# Patient Record
Sex: FEMALE | Race: BLACK OR AFRICAN AMERICAN | NOT HISPANIC OR LATINO | URBAN - METROPOLITAN AREA
[De-identification: names, ages, dates, MRNs, and addresses within clinical notes are randomized per-mention and may not be internally consistent; named-entity substitution may affect disease eponyms.]

---

## 2022-10-26 ENCOUNTER — EMERGENCY (EMERGENCY)
Facility: HOSPITAL | Age: 77
LOS: 1 days | Discharge: HOME | End: 2022-10-26
Attending: EMERGENCY MEDICINE | Admitting: EMERGENCY MEDICINE
Payer: MEDICARE

## 2022-10-26 VITALS
SYSTOLIC BLOOD PRESSURE: 136 MMHG | HEART RATE: 88 BPM | OXYGEN SATURATION: 98 % | TEMPERATURE: 98 F | RESPIRATION RATE: 18 BRPM | DIASTOLIC BLOOD PRESSURE: 88 MMHG

## 2022-10-26 DIAGNOSIS — Z88.1 ALLERGY STATUS TO OTHER ANTIBIOTIC AGENTS STATUS: ICD-10-CM

## 2022-10-26 DIAGNOSIS — I11.9 HYPERTENSIVE HEART DISEASE WITHOUT HEART FAILURE: ICD-10-CM
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
## 2022-10-26 DIAGNOSIS — I77.6 ARTERITIS, UNSPECIFIED: ICD-10-CM

## 2022-10-26 DIAGNOSIS — R07.89 OTHER CHEST PAIN: ICD-10-CM

## 2022-10-26 PROCEDURE — 93010 ELECTROCARDIOGRAM REPORT: CPT

## 2022-10-26 PROCEDURE — 99285 EMERGENCY DEPT VISIT HI MDM: CPT

## 2022-10-26 NOTE — ED ADULT TRIAGE NOTE - ARRIVAL INFO ADDITIONAL COMMENTS
Pt to ED c/o Left rib pain after being picked up with hug on oct 1st. Seen by PCP same day and "they did not see a fracture". To ed for worsening left rib pain since then.

## 2022-10-27 VITALS
SYSTOLIC BLOOD PRESSURE: 137 MMHG | HEART RATE: 75 BPM | OXYGEN SATURATION: 97 % | RESPIRATION RATE: 18 BRPM | TEMPERATURE: 98 F | DIASTOLIC BLOOD PRESSURE: 82 MMHG

## 2022-10-27 LAB
ALBUMIN SERPL ELPH-MCNC: 3.7 G/DL — SIGNIFICANT CHANGE UP (ref 3.3–5)
ALP SERPL-CCNC: 69 U/L — SIGNIFICANT CHANGE UP (ref 40–120)
ALT FLD-CCNC: 11 U/L — SIGNIFICANT CHANGE UP (ref 10–45)
ANION GAP SERPL CALC-SCNC: 8 MMOL/L — SIGNIFICANT CHANGE UP (ref 5–17)
ANISOCYTOSIS BLD QL: SLIGHT — SIGNIFICANT CHANGE UP
AST SERPL-CCNC: 10 U/L — SIGNIFICANT CHANGE UP (ref 10–40)
BASOPHILS # BLD AUTO: 0.13 K/UL — SIGNIFICANT CHANGE UP (ref 0–0.2)
BASOPHILS NFR BLD AUTO: 1.8 % — SIGNIFICANT CHANGE UP (ref 0–2)
BILIRUB SERPL-MCNC: 0.2 MG/DL — SIGNIFICANT CHANGE UP (ref 0.2–1.2)
BUN SERPL-MCNC: 33 MG/DL — HIGH (ref 7–23)
CALCIUM SERPL-MCNC: 9.9 MG/DL — SIGNIFICANT CHANGE UP (ref 8.4–10.5)
CHLORIDE SERPL-SCNC: 106 MMOL/L — SIGNIFICANT CHANGE UP (ref 96–108)
CK MB CFR SERPL CALC: 1.7 NG/ML — SIGNIFICANT CHANGE UP (ref 0–6.7)
CK SERPL-CCNC: 19 U/L — LOW (ref 25–170)
CO2 SERPL-SCNC: 24 MMOL/L — SIGNIFICANT CHANGE UP (ref 22–31)
CREAT SERPL-MCNC: 0.98 MG/DL — SIGNIFICANT CHANGE UP (ref 0.5–1.3)
EGFR: 60 ML/MIN/1.73M2 — SIGNIFICANT CHANGE UP
EOSINOPHIL # BLD AUTO: 0 K/UL — SIGNIFICANT CHANGE UP (ref 0–0.5)
EOSINOPHIL NFR BLD AUTO: 0 % — SIGNIFICANT CHANGE UP (ref 0–6)
GIANT PLATELETS BLD QL SMEAR: PRESENT — SIGNIFICANT CHANGE UP
GLUCOSE SERPL-MCNC: 109 MG/DL — HIGH (ref 70–99)
HCT VFR BLD CALC: 41.3 % — SIGNIFICANT CHANGE UP (ref 34.5–45)
HGB BLD-MCNC: 12.6 G/DL — SIGNIFICANT CHANGE UP (ref 11.5–15.5)
LYMPHOCYTES # BLD AUTO: 2.73 K/UL — SIGNIFICANT CHANGE UP (ref 1–3.3)
LYMPHOCYTES # BLD AUTO: 37.6 % — SIGNIFICANT CHANGE UP (ref 13–44)
MANUAL SMEAR VERIFICATION: SIGNIFICANT CHANGE UP
MCHC RBC-ENTMCNC: 25.2 PG — LOW (ref 27–34)
MCHC RBC-ENTMCNC: 30.5 GM/DL — LOW (ref 32–36)
MCV RBC AUTO: 82.6 FL — SIGNIFICANT CHANGE UP (ref 80–100)
MONOCYTES # BLD AUTO: 1.07 K/UL — HIGH (ref 0–0.9)
MONOCYTES NFR BLD AUTO: 14.7 % — HIGH (ref 2–14)
NEUTROPHILS # BLD AUTO: 3.06 K/UL — SIGNIFICANT CHANGE UP (ref 1.8–7.4)
NEUTROPHILS NFR BLD AUTO: 42.2 % — LOW (ref 43–77)
OVALOCYTES BLD QL SMEAR: SLIGHT — SIGNIFICANT CHANGE UP
PLAT MORPH BLD: NORMAL — SIGNIFICANT CHANGE UP
PLATELET # BLD AUTO: 225 K/UL — SIGNIFICANT CHANGE UP (ref 150–400)
POLYCHROMASIA BLD QL SMEAR: SLIGHT — SIGNIFICANT CHANGE UP
POTASSIUM SERPL-MCNC: 4.5 MMOL/L — SIGNIFICANT CHANGE UP (ref 3.5–5.3)
POTASSIUM SERPL-SCNC: 4.5 MMOL/L — SIGNIFICANT CHANGE UP (ref 3.5–5.3)
PROT SERPL-MCNC: 7.3 G/DL — SIGNIFICANT CHANGE UP (ref 6–8.3)
RBC # BLD: 5 M/UL — SIGNIFICANT CHANGE UP (ref 3.8–5.2)
RBC # FLD: 15.9 % — HIGH (ref 10.3–14.5)
RBC BLD AUTO: ABNORMAL
SCHISTOCYTES BLD QL AUTO: SLIGHT — SIGNIFICANT CHANGE UP
SMUDGE CELLS # BLD: PRESENT — SIGNIFICANT CHANGE UP
SODIUM SERPL-SCNC: 138 MMOL/L — SIGNIFICANT CHANGE UP (ref 135–145)
TROPONIN T SERPL-MCNC: 0.01 NG/ML — SIGNIFICANT CHANGE UP (ref 0–0.01)
VARIANT LYMPHS # BLD: 3.7 % — SIGNIFICANT CHANGE UP (ref 0–6)
WBC # BLD: 7.25 K/UL — SIGNIFICANT CHANGE UP (ref 3.8–10.5)
WBC # FLD AUTO: 7.25 K/UL — SIGNIFICANT CHANGE UP (ref 3.8–10.5)

## 2022-10-27 PROCEDURE — 82553 CREATINE MB FRACTION: CPT

## 2022-10-27 PROCEDURE — 36415 COLL VENOUS BLD VENIPUNCTURE: CPT

## 2022-10-27 PROCEDURE — 99285 EMERGENCY DEPT VISIT HI MDM: CPT | Mod: 25

## 2022-10-27 PROCEDURE — 80053 COMPREHEN METABOLIC PANEL: CPT

## 2022-10-27 PROCEDURE — 93005 ELECTROCARDIOGRAM TRACING: CPT

## 2022-10-27 PROCEDURE — 71250 CT THORAX DX C-: CPT | Mod: 26,MA

## 2022-10-27 PROCEDURE — 82550 ASSAY OF CK (CPK): CPT

## 2022-10-27 PROCEDURE — 84484 ASSAY OF TROPONIN QUANT: CPT

## 2022-10-27 PROCEDURE — 71250 CT THORAX DX C-: CPT | Mod: MA

## 2022-10-27 PROCEDURE — 85025 COMPLETE CBC W/AUTO DIFF WBC: CPT

## 2022-10-27 RX ORDER — TRAMADOL HYDROCHLORIDE 50 MG/1
1 TABLET ORAL
Qty: 12 | Refills: 0
Start: 2022-10-27 | End: 2022-10-29

## 2022-10-27 RX ORDER — TRAMADOL HYDROCHLORIDE 50 MG/1
50 TABLET ORAL ONCE
Refills: 0 | Status: DISCONTINUED | OUTPATIENT
Start: 2022-10-27 | End: 2022-10-27

## 2022-10-27 RX ADMIN — TRAMADOL HYDROCHLORIDE 50 MILLIGRAM(S): 50 TABLET ORAL at 03:48

## 2022-10-27 NOTE — ED PROVIDER NOTE - OBJECTIVE STATEMENT
77F hx htn, vasculitis, c/o left sided chest pain. pt states on Oct 1 her nephew picked her up and squeezed her torso. states she developed left chest pain. pt states was seen by PMD and had negative xrays. was given lidocaine and baclofen without relief.  states pain worsening. worse with deep inspiration, movement and laying down.  no fevers. no vomiting. no abd pain.

## 2022-10-27 NOTE — ED PROVIDER NOTE - PROGRESS NOTE DETAILS
no rib fracture on CT. labs checked, doubt ACS.  recommend f/u with PMD  I have discussed the discharge plan with the patient. The patient agrees with the plan, as discussed.  The patient understands Emergency Department diagnosis is a preliminary diagnosis often based on limited information and that the patient must adhere to the follow-up plan as discussed.  The patient understands that if the symptoms worsen or if prescribed medications do not have the desired/planned effect that the patient may return to the Emergency Department at any time for further evaluation and treatment.

## 2022-10-27 NOTE — ED PROVIDER NOTE - NSFOLLOWUPINSTRUCTIONS_ED_ALL_ED_FT
Follow-up with your primary care physician      Rib Contusion    A rib contusion is a deep bruise on the rib area. Contusions are the result of a blunt trauma that causes bleeding and injury to the tissues under the skin. A rib contusion may involve bruising of the ribs and of the skin and muscles in the area. The skin over the contusion may turn blue, purple, or yellow. Minor injuries result in a painless contusion. More severe contusions may be painful and swollen for a few weeks.    What are the causes?    This condition is usually caused by a hard, direct hit to an area of the body. This often occurs while playing contact sports.    What are the signs or symptoms?    Symptoms of this condition include:  •Swelling and redness of the injured area.    •Discoloration of the injured area.    •Tenderness and soreness of the injured area.    •Pain with or without movement.    •Pain when breathing in.    How is this diagnosed?    This condition may be diagnosed based on:  •Your symptoms and medical history.    •A physical exam.    •Imaging tests—such as an X-ray, CT scan, or MRI—to determine if there were internal injuries or broken bones (fractures).    How is this treated?    This condition may be treated with:  •Rest. This is often the best treatment for a rib contusion.    •Ice packs. This reduces swelling and inflammation.    •Deep-breathing exercises. These may be recommended to reduce the risk for lung collapse and pneumonia.    •Medicines. Over-the-counter or prescription medicines may be given to control pain.    •Injection of a numbing medicine around the nerve near your injury (nerve block).    Follow these instructions at home:    Medicines     •Take over-the-counter and prescription medicines only as told by your health care provider.    •Ask your health care provider if the medicine prescribed to you:  •Requires you to avoid driving or using machinery.    •Can cause constipation. You may need to take these actions to prevent or treat constipation:  •Drink enough fluid to keep your urine pale yellow.     •Take over-the-counter or prescription medicines.    •Eat foods that are high in fiber, such as beans, whole grains, and fresh fruits and vegetables.    •Limit foods that are high in fat and processed sugars, such as fried or sweet foods.     Managing pain, stiffness, and swelling      If directed, put ice on the injured area. To do this:  •Put ice in a plastic bag.    •Place a towel between your skin and the bag.    •Leave the ice on for 20 minutes, 2–3 times a day.    •Remove the ice if your skin turns bright red. This is very important. If you cannot feel pain, heat, or cold, you have a greater risk of damage to the area.    Activity     •Rest the injured area.    •Avoid strenuous activity and any activities or movements that cause pain. Be careful during activities, and avoid bumping the injured area.    • Do not lift anything that is heavier than 5 lb (2.3 kg), or the limit that you are told, until your health care provider says that it is safe.    General instructions      • Do not use any products that contain nicotine or tobacco, such as cigarettes, e-cigarettes, and chewing tobacco. These can delay healing. If you need help quitting, ask your health care provider.    •Do deep-breathing exercises as told by your health care provider.    •If you were given an incentive spirometer, use it every 1–2 hours while you are awake, or as recommended by your health care provider. This device measures how well you are filling your lungs with each breath.    •Keep all follow-up visits. This is important.    Contact a health care provider if you have:    •Increased bruising or swelling.    •Pain that is not controlled with treatment.    •A fever.    Get help right away if you:    •Have difficulty breathing or shortness of breath.    •Develop a continual cough, or you cough up thick or bloody mucus from your lungs (sputum).    •Feel nauseous or you vomit.    •Have pain in your abdomen.    These symptoms may represent a serious problem that is an emergency. Do not wait to see if the symptoms will go away. Get medical help right away. Call your local emergency services (911 in the [place] Do not drive yourself to the hospital.     Summary    •A rib contusion is a deep bruise on your rib area. Contusions are the result of a blunt trauma that causes bleeding and injury to the tissues under the skin.    •The skin over the contusion may turn blue, purple, or yellow. Minor injuries may cause a painless contusion. More severe contusions may be painful and swollen for a few weeks.    •Rest the injured area. Avoid strenuous activity and any activities or movements that cause pain.    This information is not intended to replace advice given to you by your health care provider. Make sure you discuss any questions you have with your health care provider.

## 2022-10-27 NOTE — ED PROVIDER NOTE - PATIENT PORTAL LINK FT
You can access the FollowMyHealth Patient Portal offered by [facility] by registering at the following website: [email or web address]. By joining [facility]’s FollowMyHealth portal, you will also be able to view your health information using other applications (apps) compatible with our system.

## 2022-11-10 ENCOUNTER — EMERGENCY (EMERGENCY)
Facility: HOSPITAL | Age: 77
LOS: 1 days | Discharge: HOME | End: 2022-11-10
Attending: STUDENT IN AN ORGANIZED HEALTH CARE EDUCATION/TRAINING PROGRAM | Admitting: STUDENT IN AN ORGANIZED HEALTH CARE EDUCATION/TRAINING PROGRAM
Payer: MEDICARE

## 2022-11-10 VITALS
DIASTOLIC BLOOD PRESSURE: 107 MMHG | HEART RATE: 81 BPM | RESPIRATION RATE: 17 BRPM | OXYGEN SATURATION: 98 % | SYSTOLIC BLOOD PRESSURE: 155 MMHG | TEMPERATURE: 98 F

## 2022-11-10 VITALS
HEIGHT: 64 IN | TEMPERATURE: 98 F | DIASTOLIC BLOOD PRESSURE: 88 MMHG | RESPIRATION RATE: 18 BRPM | OXYGEN SATURATION: 94 % | WEIGHT: 179.9 LBS | SYSTOLIC BLOOD PRESSURE: 138 MMHG | HEART RATE: 92 BPM

## 2022-11-10 PROBLEM — I10 ESSENTIAL (PRIMARY) HYPERTENSION: Chronic | Status: ACTIVE | Noted: 2022-10-27

## 2022-11-10 PROCEDURE — 99284 EMERGENCY DEPT VISIT MOD MDM: CPT

## 2022-11-10 PROCEDURE — 99283 EMERGENCY DEPT VISIT LOW MDM: CPT

## 2022-11-10 NOTE — ED PROVIDER NOTE - NSFOLLOWUPINSTRUCTIONS_ED_ALL_ED_FT
Follow up with your primary medical doctor as soon as possible.  Follow up with otolaryngology (ear, nose & throat) - to schedule an appointment call [phone]  Return to the emergency department if your symptoms worsen or if you develop new symptoms.    Epistaxis    Epistaxis is the medical term for a nosebleed. Nosebleeds are common and can be caused by many conditions, such as injury, infections, dry environments, medicines, nose picking, and home heating and cooling systems. Try controlling your nosebleed by pinching your nose continuously for at least 10 minutes. Avoid lying down while you are having a nosebleed. Sit up and lean forward. Avoid blowing or sniffing your nose for a number of hours after having a nosebleed. Resume your normal activities as you are able, but avoid straining, lifting, or bending at the waist for several days. Maintain humidity in your home by using less air conditioning or by using a humidifier.     If your nose was packed by your health care provider, keep the packing inside of your nose until a health care provider removes it. If a balloon catheter was used to pack your nose, do not cut or remove it unless your health care provider has instructed you to do that.     Aspirin and blood thinners make bleeding more likely. If you are prescribed these medicines and you suffer from nosebleeds, ask your health care provider if you should stop taking the medicines or adjust the dose. Do not stop medicines unless directed by your health care provider.    SEEK IMMEDIATE MEDICAL CARE IF YOU HAVE ANY OF THE FOLLOWING SYMPTOMS: nosebleed lasting longer than 20 minutes, unusual bleeding from or bruising on other parts of your body, dizziness or lightheadedness, fainting, nosebleed occurring after a head injury, or fever.

## 2022-11-10 NOTE — CONSULT NOTE ADULT - SUBJECTIVE AND OBJECTIVE BOX
ENT Consult Note    CC: Epistaxis     HPI  78 y/o F with PMhx HTN (controlled), with chronic nosebleeds, once per month, presents to ED for persistent nosebleed. Patient reports that earlier this afternoon she blew her nose and noted bleeding. She held pressure for two hours but still noted bleeding anteriorly and from the back of her throat. She notes she has been cauterized in the past last winter using chemical cauterization and prefers this over packing. Uses home humidification and minimizes use of heater at home as this in the past exacerbates her nose bleeds.     On arrival to ED patient was bleeding from left nostril. A merocel was placed into left nostril but bleeding then noted from right side. Vitals wnl.      PE  NAD  Anterior rhinoscopy: small clot suctioned from left nostril, area of slow bleeding noted on anterior surface of middle turbinate. On right side, small area of clot/crust on anterior septum. OC: small clot    Bedside procedure: R/b/a of procedure discussed and patient verbally consented. Using nasal speculum, a silver nitrate stick was applied to two areas of active ooze on the left middle turbinate. SurgiFlo then placed into left nasal cavity. On right side, SurgiFlo was palced over a small area of dried crusting/clot on septum. A small piece of Surgicel was placed over the area of clot and held with pressure for 1 minute. Epistaxis was then noted to be controlled.    A/P: 77F PMH HTN p/w bilateral epistaxis s/p chemical cauterization and light nasal packing   -Packing is absorbable does not need to be removed  -Patient provided Afrin and coached on nasal hygiene  -Patient can call ENT office at [phone] for follow up if needed  -Please provide the patient with the following instructions upon discharge:    1. No nose blowing for three days. Sneeze with your mouth open.   2. Do not pick your nose.  3. Visit your primary care doctor to make sure your blood pressure is under control.  4. The packing in your nose is absorbable and will go away on its own. Do not try to take it out.   5. In three days, start using nasal saline sprays 2-3 times a day.  6. Use a humidifier  7. Put mupirocin or vaseline on your nostrils nightly.   8. If you have a nosebleed: Spray Afrin in both nostrils and hold pressure over the soft, lower part of your nose, firmly for 15 minutes without letting go (look at a clock to make sure you are holding it long enough). If you are still bleeding after that, try another round of 15 minutes. If you continue to bleed, head to the emergency room.

## 2022-11-10 NOTE — ED PROVIDER NOTE - PATIENT PORTAL LINK FT
You can access the FollowMyHealth Patient Portal offered by [facility] by registering at the following website: [email or web address]. By joining [facility]’s FollowMyHealth portal, you will also be able to view your health information using other applications (apps) compatible with our system.

## 2022-11-10 NOTE — ED PROVIDER NOTE - OBJECTIVE STATEMENT
76 yo F w PMH HTN, epistaxis s/p caudery p/w L nares epistaxis since today. Pt states episode started after blowing nose at 2pm today. Pt held pressure on nose for several hours, in ED bleeding has subsided. She is tolerating secretions, no MARVIN. She has no other complaints. 78 yo F w PMH HTN, epistaxis s/p cautery p/w L nares epistaxis since today. Pt states episode started after blowing nose at 2pm today. Pt held pressure on nose for several hours, in ED bleeding has subsided. She is tolerating secretions, no MARVIN. She has no other complaints.

## 2022-11-10 NOTE — ED PROVIDER NOTE - PHYSICAL EXAMINATION
CONSTITUTIONAL: Non-toxic; in no apparent distress  HEAD: Normocephalic; atraumatic  EYES: PERRL; EOM intact   ENMT: External appears normal, +L nares epistaxis, no septal hematoma  NECK: Supple; non-tender  CARD: Normal S1, S2; no murmurs, rubs, or gallops  RESP: Normal chest excursion with respiration; breath sounds clear and equal bilaterally  ABD: Soft, non-distended; non-tender  EXT: Normal ROM in all four extremities; non-tender to palpation  SKIN: Warm, dry, no rash  NEURO:  No focal neurological deficiencies.

## 2022-11-10 NOTE — ED PROVIDER NOTE - NS ED ROS FT
CONSTITUTIONAL: No fever, no chills, no fatigue  EYES: No eye redness, no visual changes  ENT: No ear pain, no sore throat, +nosebleed  CARDIOVASCULAR: No chest pain, no palpitations  RESPIRATORY: No cough, no SOB  GI: No abdominal pain, no nausea, no vomiting, no constipation, no diarrhea  GENITOURINARY: No dysuria, no frequency, no hematuria  MUSCULOSKELETAL: No back pain, no joint pain, no myalgias  SKIN: No rash, no peripheral edema  NEURO: No headache, no confusion    ALL OTHER SYSTEMS NEGATIVE.

## 2022-11-10 NOTE — ED PROVIDER NOTE - CLINICAL SUMMARY MEDICAL DECISION MAKING FREE TEXT BOX
Epistaxis, resolved in ED. Will see ENT tomorrow. No septal hematoma. Epistaxis, resolved in ED. Will see ENT tomorrow. No septal hematoma. No AC.

## 2022-11-10 NOTE — ED ADULT NURSE NOTE - OBJECTIVE STATEMENT
Patient is a 77yoF with hx of htn presenting to the ED for epistaxis x today. Patient is awake and alert, axox3, spont breathing on RA. Patient reports that she started to have nose bleeding that started on its own, denies trauma. Has been attempting to hold pressure on it with no relief. Reports that she is swallowing a lot of blood and can feel the blood dripping down her throat, airway intact. Denies ha/dizziness. States that she has had this happen in the past, denies taking AC. Denies chest pain, no nausea/vomiting.

## 2022-11-10 NOTE — ED ADULT TRIAGE NOTE - ARRIVAL INFO ADDITIONAL COMMENTS
Pt to ED c/o epistaxis to left nostril starting at 14:00. Denies trauma, blood thinner use, nor dizziness/ visual changes. Pt reports having cauterization due to epistaxis in [place]

## 2022-11-13 DIAGNOSIS — R04.0 EPISTAXIS: ICD-10-CM

## 2022-11-13 DIAGNOSIS — I10 ESSENTIAL (PRIMARY) HYPERTENSION: ICD-10-CM

## 2023-02-07 ENCOUNTER — APPOINTMENT (OUTPATIENT)
Dept: HOME HEALTH SERVICES | Facility: HOME HEALTH | Age: 78
End: 2023-02-07
Payer: MEDICARE

## 2023-02-07 VITALS
WEIGHT: 176.38 LBS | HEART RATE: 62 BPM | BODY MASS INDEX: 30.11 KG/M2 | HEIGHT: 64 IN | TEMPERATURE: 97.2 F | SYSTOLIC BLOOD PRESSURE: 140 MMHG | DIASTOLIC BLOOD PRESSURE: 95 MMHG | OXYGEN SATURATION: 99 % | RESPIRATION RATE: 16 BRPM

## 2023-02-07 DIAGNOSIS — E78.5 HYPERLIPIDEMIA, UNSPECIFIED: ICD-10-CM

## 2023-02-07 DIAGNOSIS — Z87.898 PERSONAL HISTORY OF OTHER SPECIFIED CONDITIONS: ICD-10-CM

## 2023-02-07 DIAGNOSIS — I10 ESSENTIAL (PRIMARY) HYPERTENSION: ICD-10-CM

## 2023-02-07 DIAGNOSIS — Z87.891 PERSONAL HISTORY OF NICOTINE DEPENDENCE: ICD-10-CM

## 2023-02-07 PROBLEM — Z00.00 ENCOUNTER FOR PREVENTIVE HEALTH EXAMINATION: Status: ACTIVE | Noted: 2023-02-07

## 2023-02-07 PROCEDURE — 99344 HOME/RES VST NEW MOD MDM 60: CPT | Mod: 25,95

## 2023-02-07 PROCEDURE — 99497 ADVNCD CARE PLAN 30 MIN: CPT | Mod: 95

## 2023-02-07 NOTE — HISTORY OF PRESENT ILLNESS
[FreeTextEntry2] : ALYSE WILLSON is being seen for a visit provided via telehealth. Real-time audio visual technology was provided using [employer]. If Teledoc technology was not used it was due to inability to connect via Teledoc technology.\par \par  \par \par ALYSE WILLSON (Sep 29 1945) or his/her representative was educated about potential risks associated with any technology used while obtaining care through telehealth during this public health emergency. ALYSE WILLSON (Sep 29 1945) or his/her representative was educated that this Informed Consent for Telehealth Services During a Public Health Emergency will remain in effect solely during the term of the public health emergency. ALYSE WILLSON (Sep 29 1945) or his/her representative has verbally consented to the use of telehealth on 02/07/2023  2:00PM.\par \par  \par \par ALYSE WILLSON was located at their home, [address] [place] [address]\[place] [address], at the time of the visit.\par \par The [facility] clinician, MIC SUNSHINE, was located remotely at their home in [place] at the time of the visit.\par \par The patient, ALYSE WILLSON, and the [facility] clinician, MIC SUNSHINE, participated in the telehealth encounter.\par \par Other participants included the RNTT, who was in the home with the patient, performed vitals monitoring and physical exam, and facilitated the video visit with MIC SUNSHINE. The assessment and plan was made on the basis of the information provided by the RNTT.\par \par  \par \par Other participants included: []\par \par  \par \par RNTT Findings:\par \par  \par \par      Vitals: T 97.5F HR 62 RR 16 /95mmHg SpO2 99%\par \par  \par \par      Exam: +1 bilateral pitting edema\par \par  \par \par      Assessment: ALYSE WILLSON is an 77 year with Hypertension, Hypertensive Heart Disease with CHF, CHF systolic dysfunction, Atrial Fibrillation, CVA, CHronic Hepatitis Cm Hyperlipidemia, Vasculitis seen today for initial home visit\par \par Patient's last hospitalization was in 11/2022 for CVA, admitted for CVA secondary to AFib, initially had right sided weakness which has improved. Patient reports she was placed on blood thinners for A Fib but would get severe nosebleeds that did not to conservative measures and usually needs cauterization. Patient reports she is awaiting Watchman placement scheduled for 03/09/2023. Patient uses 80mg verapamil if heart rate goes above 85.\par \par Since hospitalization patient reports no new complaints.\par \par \par Patient/ patient's caregiver reports no weight loss >10 lbs in the past 6 months. No changes in dentition or swallowing reported, No changes in hearing or vision reported. No changes in Cognition reported. Patient denies any symptoms of depression or anxiety. Patient is ADL independent and IADL independent. No changes in gait or falls reported. \par \par Patient's home environment is safe.\par  \par Goals of care discussed. MOLST not completed. Full code, No limitis\par \par

## 2023-02-07 NOTE — COUNSELING
[Obese -  ( BMI  >29.9 )] : obese - ( BMI  >29.9 ) [Weight counseling provided] : weight counseling provided [Medical/Nutritional supplementation as prescribed] : medical/nutritional supplementation as prescribed [Non - Smoker] : non-smoker [Medical alert] : medical alert [] : foot exam [Patient not on disease-modifying anti-rheumatic drug due to overall prognosis] : Patient not on disease-modifying anti-rheumatic drug due to overall prognosis [Completed] : Aspirin use discussion completed [Minimize unnecessary interventions] : minimize unnecessary interventions [Discussed disease trajectory with patient/caregiver] : discussed disease trajectory with patient/caregiver [Advanced Directives discussed: ____] : Advanced directives discussed: [unfilled] [Full Code] : Code Status: Full Code [No Limitations] : Treatment Guidelines: No limitations [Long Term Intubation] : Intubation: Long term intubation [[facility]: _____] [facility]: [unfilled] [_____] : HCP: [unfilled]

## 2023-02-07 NOTE — HEALTH RISK ASSESSMENT
[HRA Reviewed] : Health risk assessment reviewed [Independent] : managing finances [No falls in past year] : Patient reported no falls in the past year [No] : The patient does not have visual impairment [TimeGetUpGo] : 10

## 2023-02-07 NOTE — CHRONIC CARE ASSESSMENT
[PPS Score: ____] : Palliative Performance Scale (PPS) Score: [unfilled] [de-identified] : None [de-identified] : walks regularly [de-identified] : Low salt, low fat

## 2023-02-07 NOTE — PHYSICAL EXAM
[No Acute Distress] : no acute distress [Normal Sclera/Conjunctiva] : normal sclera/conjunctiva [EOMI] : extra ocular movement intact [Normal Outer Ear/Nose] : the ears and nose were normal in appearance [Normal Oropharynx] : the oropharynx was normal [No Respiratory Distress] : no respiratory distress [Clear to Auscultation] : lungs were clear to auscultation bilaterally [No Accessory Muscle Use] : no accessory muscle use [Normal Rate] : heart rate was normal  [Regular Rhythm] : with a regular rhythm [Normal S1, S2] : normal S1 and S2 [No Murmurs] : no murmurs heard [No Edema] : there was no peripheral edema [Normal Bowel Sounds] : normal bowel sounds [Non Tender] : non-tender [Soft] : abdomen soft [Not Distended] : not distended [Normal Post Cervical Nodes] : no posterior cervical lymphadenopathy [Normal Anterior Cervical Nodes] : no anterior cervical lymphadenopathy [No CVA Tenderness] : no ~M costovertebral angle tenderness [No Spinal Tenderness] : no spinal tenderness [Normal Gait] : normal gait [Normal Strength/Tone] : muscle strength and tone were normal [No Rash] : no rash [Oriented x3] : oriented to person, place, and time [Normal Affect] : the affect was normal [de-identified] : obese female sitting up on chair

## 2023-02-07 NOTE — REASON FOR VISIT
[Initial Evaluation] : an initial evaluation [Pre-Visit Preparation] : pre-visit preparation was not done [FreeTextEntry1] : Hypertension, Hypertensive Heart Disease with CHF, CHF systolic dysfunction, Atrial Fibrillation, CVA, CHronic Hepatitis Cm Hyperlipidemia, Vasculitis [FreeTextEntry3] : Cardiology, GI

## 2023-04-05 ENCOUNTER — TRANSCRIPTION ENCOUNTER (OUTPATIENT)
Age: 78
End: 2023-04-05

## 2023-04-22 ENCOUNTER — TRANSCRIPTION ENCOUNTER (OUTPATIENT)
Age: 78
End: 2023-04-22

## 2023-05-24 ENCOUNTER — TRANSCRIPTION ENCOUNTER (OUTPATIENT)
Age: 78
End: 2023-05-24

## 2023-05-24 ENCOUNTER — NON-APPOINTMENT (OUTPATIENT)
Age: 78
End: 2023-05-24

## 2023-05-25 ENCOUNTER — APPOINTMENT (OUTPATIENT)
Dept: HOME HEALTH SERVICES | Facility: HOME HEALTH | Age: 78
End: 2023-05-25

## 2023-05-25 VITALS
OXYGEN SATURATION: 98 % | RESPIRATION RATE: 17 BRPM | HEART RATE: 71 BPM | TEMPERATURE: 97 F | SYSTOLIC BLOOD PRESSURE: 120 MMHG | DIASTOLIC BLOOD PRESSURE: 80 MMHG

## 2023-05-25 RX ORDER — VERAPAMIL HYDROCHLORIDE 80 MG/1
80 TABLET ORAL AS DIRECTED
Qty: 90 | Refills: 3 | Status: ACTIVE | COMMUNITY
Start: 2023-02-07

## 2023-05-30 ENCOUNTER — APPOINTMENT (OUTPATIENT)
Dept: HOME HEALTH SERVICES | Facility: HOME HEALTH | Age: 78
End: 2023-05-30
Payer: MEDICARE

## 2023-05-30 VITALS
SYSTOLIC BLOOD PRESSURE: 128 MMHG | DIASTOLIC BLOOD PRESSURE: 74 MMHG | TEMPERATURE: 97.7 F | HEART RATE: 66 BPM | RESPIRATION RATE: 18 BRPM | OXYGEN SATURATION: 98 %

## 2023-05-30 PROCEDURE — 99349 HOME/RES VST EST MOD MDM 40: CPT

## 2023-05-30 RX ORDER — SPIRONOLACTONE AND HYDROCHLOROTHIAZIDE 25; 25 MG/1; MG/1
25-25 TABLET, FILM COATED ORAL DAILY
Qty: 90 | Refills: 3 | Status: ACTIVE | COMMUNITY
Start: 2023-02-07 | End: 1900-01-01

## 2023-05-30 RX ORDER — AMIODARONE HYDROCHLORIDE 200 MG/1
200 TABLET ORAL DAILY
Qty: 90 | Refills: 3 | Status: DISCONTINUED | COMMUNITY
Start: 2023-02-07 | End: 2023-05-30

## 2023-05-30 RX ORDER — DABIGATRAN ETEXILATE 150 MG/1
150 CAPSULE ORAL DAILY
Qty: 90 | Refills: 3 | Status: DISCONTINUED | COMMUNITY
Start: 2023-02-07 | End: 2023-05-30

## 2023-05-30 RX ORDER — VERAPAMIL HYDROCHLORIDE 240 MG/1
240 TABLET ORAL
Qty: 180 | Refills: 1 | Status: ACTIVE | COMMUNITY
Start: 2023-02-07 | End: 1900-01-01

## 2023-05-30 RX ORDER — ATORVASTATIN CALCIUM 80 MG/1
80 TABLET, FILM COATED ORAL
Qty: 1 | Refills: 3 | Status: DISCONTINUED | COMMUNITY
Start: 2023-02-07 | End: 2023-05-30

## 2023-05-30 NOTE — REASON FOR VISIT
[Follow-Up] : a follow-up visit [Other: _____] : [unfilled] [Intercurrent Specialty/Sub-specialty Visits] : the patient has intercurrent specialty/sub-specialty visits [Pre-Visit Preparation] : pre-visit preparation was not done [FreeTextEntry1] : Hypertension, Hypertensive Heart Disease with CHF, CHF systolic dysfunction, Atrial Fibrillation, CVA, Chronic Hepatitis C,  Hyperlipidemia, Vasculitis [FreeTextEntry3] : Cardiology, Vascular surgeon

## 2023-05-30 NOTE — HISTORY OF PRESENT ILLNESS
[[facility] Co-Management Patient] : [unfilled] is a [facility] co-management patient [Patient is stable] : patient is stable [Education provided] : education provided [A] : A [FreeTextEntry4] : Bo Perkins [FreeTextEntry2] : 05/30/2023 COVID SCREEN:\par Patient or caretaker denies fever, cough, trouble breathing, rash, vomiting. Patient has not been in close contact with anyone who is COVID-19 positive, or suspected of having COVID-19.\par \par N95 mask, gloves, eye wear and gown (if indicated) used during visit: Yes.\par \par Total face to face time with patient is 45 min.\par \par \par ALYSE WILLSON is an 77 year with Hypertension, Hypertensive Heart Disease with CHF, CHF systolic dysfunction, Atrial Fibrillation, CVA, Chronic Hepatitis C, Hyperlipidemia, Vasculitis seen today for follow-up home visit\par \par Patient's last hospitalization was in 11/2022 for CVA, admitted for CVA secondary to AFib, initially had right sided weakness which has improved. Patient reports community PCP d/c blood thinners for A Fib but due to her getting  severe nosebleeds that did not to conservative measures and usually needs cauterization. Patient uses 80mg verapamil if heart rate goes above 85. \par \par She reported receiving watchman in March 2023. Also patient reported of her visit to vascular surgeon for treatment of the right vasculitis. She reports receiving treatment with ace wrap on the right leg and was told not to take ace wrap off till next Monday June 5th, 2023 at the office visit. Patient reported of venous ulcer to the right lateral leg. She reports getting treatment from the vascular surgeon during her visits at the office. \par \par Since hospitalization patient reports no new complaints.\par \par \par Patient/ patient's caregiver reports no weight loss >10 lbs in the past 6 months. No changes in dentition or swallowing reported, No changes in hearing or vision reported. No changes in Cognition reported. Patient denies any symptoms of depression or anxiety. Patient is ADL independent and IADL independent. No changes in gait or falls reported. \par \par Patient's home environment is safe.\par  \par Goals of care discussed.\par \par

## 2023-05-30 NOTE — COUNSELING
[] : foot exam [Patient not on disease-modifying anti-rheumatic drug due to overall prognosis] : Patient not on disease-modifying anti-rheumatic drug due to overall prognosis [Completed] : Aspirin use discussion completed [Full Code] : Code Status: Full Code [No Limitations] : Treatment Guidelines: No limitations [Long Term Intubation] : Intubation: Long term intubation [[facility]: _____] [facility]: [unfilled] [_____] : HCP: [unfilled] [Obese -  ( BMI  >29.9 )] : obese - ( BMI  >29.9 ) [Weight counseling provided] : weight counseling provided [Sodium restriction 2gm recommended] : sodium restriction 2 gm recommended [Medical/Nutritional supplementation as prescribed] : medical/nutritional supplementation as prescribed [Non - Smoker] : non-smoker [Medical alert] : medical alert [Minimize unnecessary interventions] : minimize unnecessary interventions [Discussed disease trajectory with patient/caregiver] : discussed disease trajectory with patient/caregiver

## 2023-05-30 NOTE — CHRONIC CARE ASSESSMENT
[PPS Score: ____] : Palliative Performance Scale (PPS) Score: [unfilled] [de-identified] : None [de-identified] : walks regularly [de-identified] : Low salt, low fat

## 2023-05-30 NOTE — HEALTH RISK ASSESSMENT
[Independent] : managing finances [No falls in past year] : Patient reported no falls in the past year [No] : The patient does not have visual impairment [HRA Reviewed] : Health risk assessment reviewed [TimeGetUpGo] : 10

## 2023-05-30 NOTE — PHYSICAL EXAM
[EOMI] : extra ocular movement intact [Normal Oropharynx] : the oropharynx was normal [Clear to Auscultation] : lungs were clear to auscultation bilaterally [No Accessory Muscle Use] : no accessory muscle use [Regular Rhythm] : with a regular rhythm [Normal S1, S2] : normal S1 and S2 [No Murmurs] : no murmurs heard [No Edema] : there was no peripheral edema [No Rash] : no rash [Normal Affect] : the affect was normal [No Acute Distress] : no acute distress [Normal Sclera/Conjunctiva] : normal sclera/conjunctiva [Normal Outer Ear/Nose] : the ears and nose were normal in appearance [Normal Nasal Mucosa] : the nasal mucosa was normal [No JVD] : no jugular venous distention [No Respiratory Distress] : no respiratory distress [Normal Rate] : heart rate was normal  [Breast Exam Declined] : patient declined to have breast exam done [Normal Bowel Sounds] : normal bowel sounds [Non Tender] : non-tender [Soft] : abdomen soft [Not Distended] : not distended [Patient Refused] : rectal exam was refused by the patient [Normal Post Cervical Nodes] : no posterior cervical lymphadenopathy [Normal Anterior Cervical Nodes] : no anterior cervical lymphadenopathy [No CVA Tenderness] : no ~M costovertebral angle tenderness [No Spinal Tenderness] : no spinal tenderness [Normal Gait] : normal gait [No Joint Swelling] : no joint swelling seen [No Clubbing, Cyanosis] : no clubbing  or cyanosis of the fingernails [Normal Strength/Tone] : muscle strength and tone were normal [Cranial Nerves Intact] : cranial nerves 2-12 were intact [No Motor Deficits] : the motor exam was normal [No Gross Sensory Deficits] : no gross sensory deficits [Normal Reflexes] : deep tendon reflexes were 2+ and symmetric [Oriented x3] : oriented to person, place, and time [de-identified] : obese female sitting up on chair [Foot Ulcers] : no foot ulcers [de-identified] : Irrigular rhythm [de-identified] : Patient refused  [de-identified] : Patient reported of venous ulcer to the right lower extremity, not able to assess, patient refused taking off dressing as she stated requested by vascular surgeon. Edema to b/l lower extremities

## 2023-06-01 DIAGNOSIS — D64.9 ANEMIA, UNSPECIFIED: ICD-10-CM

## 2023-06-01 LAB
ANION GAP SERPL CALC-SCNC: 12 MMOL/L
BUN SERPL-MCNC: 30 MG/DL
CALCIUM SERPL-MCNC: 9.8 MG/DL
CHLORIDE SERPL-SCNC: 106 MMOL/L
CHOLEST SERPL-MCNC: 253 MG/DL
CO2 SERPL-SCNC: 22 MMOL/L
CREAT SERPL-MCNC: 1.18 MG/DL
EGFR: 48 ML/MIN/1.73M2
GLUCOSE SERPL-MCNC: 89 MG/DL
HDLC SERPL-MCNC: 114 MG/DL
LDLC SERPL CALC-MCNC: 132 MG/DL
NONHDLC SERPL-MCNC: 139 MG/DL
POTASSIUM SERPL-SCNC: 4.8 MMOL/L
SODIUM SERPL-SCNC: 140 MMOL/L
TRIGL SERPL-MCNC: 34 MG/DL

## 2023-06-01 RX ORDER — MONTELUKAST 10 MG/1
10 TABLET, FILM COATED ORAL
Qty: 90 | Refills: 0 | Status: ACTIVE | COMMUNITY
Start: 2023-05-04

## 2023-06-01 RX ORDER — CHLORHEXIDINE GLUCONATE 4 %
325 (65 FE) LIQUID (ML) TOPICAL DAILY
Qty: 30 | Refills: 3 | Status: ACTIVE | COMMUNITY
Start: 2023-06-01 | End: 1900-01-01

## 2023-06-01 RX ORDER — CICLOPIROX OLAMINE 7.7 MG/G
0.77 CREAM TOPICAL
Qty: 90 | Refills: 0 | Status: DISCONTINUED | COMMUNITY
Start: 2023-03-16

## 2023-06-01 RX ORDER — DOXYCYCLINE HYCLATE 100 MG/1
100 TABLET ORAL
Qty: 10 | Refills: 0 | Status: COMPLETED | COMMUNITY
Start: 2022-12-20

## 2023-07-28 ENCOUNTER — APPOINTMENT (OUTPATIENT)
Dept: HOME HEALTH SERVICES | Facility: HOME HEALTH | Age: 78
End: 2023-07-28

## 2023-08-07 ENCOUNTER — TRANSCRIPTION ENCOUNTER (OUTPATIENT)
Age: 78
End: 2023-08-07

## 2023-08-07 ENCOUNTER — NON-APPOINTMENT (OUTPATIENT)
Age: 78
End: 2023-08-07

## 2023-08-08 ENCOUNTER — NON-APPOINTMENT (OUTPATIENT)
Age: 78
End: 2023-08-08

## 2023-08-09 ENCOUNTER — NON-APPOINTMENT (OUTPATIENT)
Age: 78
End: 2023-08-09

## 2023-08-10 ENCOUNTER — NON-APPOINTMENT (OUTPATIENT)
Age: 78
End: 2023-08-10

## 2023-09-19 ENCOUNTER — APPOINTMENT (OUTPATIENT)
Dept: HOME HEALTH SERVICES | Facility: HOME HEALTH | Age: 78
End: 2023-09-19
Payer: MEDICARE

## 2023-09-19 VITALS
TEMPERATURE: 97.2 F | OXYGEN SATURATION: 99 % | SYSTOLIC BLOOD PRESSURE: 132 MMHG | HEIGHT: 64 IN | DIASTOLIC BLOOD PRESSURE: 76 MMHG | HEART RATE: 82 BPM | RESPIRATION RATE: 20 BRPM

## 2023-09-19 PROCEDURE — 99349 HOME/RES VST EST MOD MDM 40: CPT

## 2023-09-19 RX ORDER — ALBUTEROL SULFATE 90 UG/1
108 (90 BASE) INHALANT RESPIRATORY (INHALATION)
Qty: 1 | Refills: 3 | Status: ACTIVE | COMMUNITY
Start: 2023-02-07 | End: 1900-01-01

## 2023-11-04 ENCOUNTER — TRANSCRIPTION ENCOUNTER (OUTPATIENT)
Age: 78
End: 2023-11-04

## 2023-11-04 ENCOUNTER — NON-APPOINTMENT (OUTPATIENT)
Age: 78
End: 2023-11-04

## 2023-11-06 ENCOUNTER — NON-APPOINTMENT (OUTPATIENT)
Age: 78
End: 2023-11-06

## 2023-11-08 ENCOUNTER — TRANSCRIPTION ENCOUNTER (OUTPATIENT)
Age: 78
End: 2023-11-08

## 2023-11-08 ENCOUNTER — NON-APPOINTMENT (OUTPATIENT)
Age: 78
End: 2023-11-08

## 2023-11-08 RX ORDER — TRAMADOL HYDROCHLORIDE 50 MG/1
50 TABLET, COATED ORAL
Qty: 120 | Refills: 0 | Status: ACTIVE | COMMUNITY
Start: 2023-11-08 | End: 1900-01-01

## 2023-11-09 ENCOUNTER — NON-APPOINTMENT (OUTPATIENT)
Age: 78
End: 2023-11-09

## 2023-11-09 DIAGNOSIS — M85.872 OTHER SPECIFIED DISORDERS OF BONE DENSITY AND STRUCTURE, LEFT ANKLE AND FOOT: ICD-10-CM

## 2023-11-09 DIAGNOSIS — M85.871 OTHER SPECIFIED DISORDERS OF BONE DENSITY AND STRUCTURE, RIGHT ANKLE AND FOOT: ICD-10-CM

## 2023-11-09 RX ORDER — CALCIUM CARBONATE/VITAMIN D3 600MG-5MCG
600-5 TABLET ORAL DAILY
Qty: 30 | Refills: 3 | Status: ACTIVE | COMMUNITY
Start: 2023-11-09 | End: 1900-01-01

## 2023-11-20 ENCOUNTER — APPOINTMENT (OUTPATIENT)
Dept: HOME HEALTH SERVICES | Facility: HOME HEALTH | Age: 78
End: 2023-11-20

## 2024-01-22 ENCOUNTER — APPOINTMENT (OUTPATIENT)
Dept: HOME HEALTH SERVICES | Facility: HOME HEALTH | Age: 79
End: 2024-01-22
Payer: MEDICARE

## 2024-01-22 VITALS
DIASTOLIC BLOOD PRESSURE: 82 MMHG | HEART RATE: 76 BPM | HEIGHT: 64 IN | SYSTOLIC BLOOD PRESSURE: 126 MMHG | RESPIRATION RATE: 18 BRPM | OXYGEN SATURATION: 97 % | TEMPERATURE: 98 F

## 2024-01-22 DIAGNOSIS — Z71.89 OTHER SPECIFIED COUNSELING: ICD-10-CM

## 2024-01-22 DIAGNOSIS — Z23 ENCOUNTER FOR IMMUNIZATION: ICD-10-CM

## 2024-01-22 PROCEDURE — 99349 HOME/RES VST EST MOD MDM 40: CPT

## 2024-01-22 RX ORDER — CALCIUM CARBONATE/VITAMIN D3 600 MG-10
600-10 TABLET ORAL
Qty: 30 | Refills: 0 | Status: DISCONTINUED | COMMUNITY
Start: 2023-11-09 | End: 2024-01-22

## 2024-01-22 RX ORDER — METHYLPREDNISOLONE 4 MG/1
4 TABLET ORAL
Qty: 21 | Refills: 0 | Status: ACTIVE | COMMUNITY
Start: 2023-11-10

## 2024-01-22 RX ORDER — OMEPRAZOLE 20 MG/1
20 CAPSULE, DELAYED RELEASE ORAL
Qty: 10 | Refills: 0 | Status: ACTIVE | COMMUNITY
Start: 2023-11-10

## 2024-01-22 NOTE — PHYSICAL EXAM
[Normal Reflexes] : deep tendon reflexes were 2+ and symmetric [No Acute Distress] : no acute distress [Normal Voice/Communication] : normal voice communication [Normal Sclera/Conjunctiva] : normal sclera/conjunctiva [EOMI] : extra ocular movement intact [Normal Outer Ear/Nose] : the ears and nose were normal in appearance [Normal Nasal Mucosa] : the nasal mucosa was normal [No JVD] : no jugular venous distention [No LAD] : no lymphadenopathy [No Respiratory Distress] : no respiratory distress [Clear to Auscultation] : lungs were clear to auscultation bilaterally [No Accessory Muscle Use] : no accessory muscle use [Normal Rate] : heart rate was normal  [Normal S1, S2] : normal S1 and S2 [No Murmurs] : no murmurs heard [Breast Exam Declined] : patient declined to have breast exam done [Normal Bowel Sounds] : normal bowel sounds [Non Tender] : non-tender [Soft] : abdomen soft [Not Distended] : not distended [Normal Post Cervical Nodes] : no posterior cervical lymphadenopathy [Normal Anterior Cervical Nodes] : no anterior cervical lymphadenopathy [No CVA Tenderness] : no ~M costovertebral angle tenderness [No Spinal Tenderness] : no spinal tenderness [No Joint Swelling] : no joint swelling seen [Normal Strength/Tone] : muscle strength and tone were normal [No Rash] : no rash [Cranial Nerves Intact] : cranial nerves 2-12 were intact [No Motor Deficits] : the motor exam was normal [Oriented x3] : oriented to person, place, and time [Scoliosis] : scoliosis not present [Over the Past 2 Weeks, Have You Felt Down, Depressed, or Hopeless?] : 1.) Over the past 2 weeks, have you felt down, depressed, or hopeless? No [Over the Past 2 Weeks, Have You Felt Little Interest or Pleasure Doing Things?] : 2.) Over the past 2 weeks, have you felt little interest or pleasure doing things? No [Foot Ulcers] : no foot ulcers [de-identified] : b/l ankle  edema left +2, and right_+1 [de-identified] : vascular ulcers

## 2024-01-22 NOTE — HEALTH RISK ASSESSMENT
[Independent] : managing medications [No falls in past year] : Patient reported no falls in the past year [No] : The patient does not have visual impairment [HRA Reviewed] : Health risk assessment reviewed [Some assistance needed] : preparing meals [TimeGetUpGo] : 5 [de-identified] : none, use cane in community

## 2024-01-22 NOTE — CURRENT MEDS
[Adherent to medications] : Patient is adherent to medications as prescribed [Medication and Allergies Reconciled] : medication and allergies reconciled [High Risk Medications Reviewed and Reconciled (Beers Criteria)] : high risk medications reviewed and reconciled [Reviewed patient reported medication adherence from Comprehensive Assessment] : Reviewed patient reported medication adherence from comprehensive assessment

## 2024-01-22 NOTE — REASON FOR VISIT
[Follow-Up] : a follow-up visit [Pre-Visit Preparation] : pre-visit preparation was done [Intercurrent Specialty/Sub-specialty Visits] : the patient has intercurrent specialty/sub-specialty visits [FreeTextEntry1] : Hypertension, Hypertensive Heart Disease with CHF, CHF systolic dysfunction, Atrial Fibrillation, CVA, Chronic Hepatitis C,  Hyperlipidemia, Vasculitis [FreeTextEntry2] : chart review [FreeTextEntry3] : Cardiology, Vascular surgeon, cardiology for watchman device, orthopedic

## 2024-01-22 NOTE — REVIEW OF SYSTEMS
[Lower Ext Edema] : lower extremity edema [Unsteady Walking] : ataxia [Negative] : Heme/Lymph [Chest Pain] : no chest pain [Leg Claudication] : no leg claudication [Palpitations] : no palpitations [Orthopnea] : no orthopnea [Paroxysmal Nocturnal Dyspnea] : no paroxysmal nocturnal dyspnea [Itching] : no itching [Mole Changes] : no mole changes [Nail Changes] : no nail changes [Hair Changes] : no hair changes [Skin Rash] : no skin rash [Headache] : no headache [Dizziness] : no dizziness [Fainting] : no fainting [Confusion] : no confusion [Memory Loss] : no memory loss [FreeTextEntry5] : b/l ankle edema to left +2 and right +1 [de-identified] : vascular ulcer [de-identified] : use cane outside due to unsteady walking

## 2024-01-22 NOTE — CHRONIC CARE ASSESSMENT
[General Adherence] : and is generally adherent [PPS Score: ____] : Palliative Performance Scale (PPS) Score: [unfilled] [3 or More Times Per Week] : exercises 3 or more times per week [Low Fat Diet] : low fat [Walking] : walking [Low Salt Diet] : low salt [Low Carb Diet] : low carbohydrate [de-identified] : intensive cardiac history, vasculitis following with vascular surgeon, A-fib, s/p watchman device, CHF [de-identified] : Low salt, low fat, CHF diet, low carb [de-identified] : walks regularly [FreeTextEntry1] : CHF diet management

## 2024-01-22 NOTE — COUNSELING
[] : foot exam [Not Recommended] : Aspirin use not recommended due to overall prognosis [No Limitations] : Treatment Guidelines: No limitations [Long Term Intubation] : Intubation: Long term intubation [Overweight - ( BMI 25.1 - 29.9 )] : overweight -  ( BMI 25.1 - 29.9 ) [Sodium restriction 2gm recommended] : sodium restriction 2 gm recommended [Non - Smoker] : non-smoker [Use grab bars] : use grab bars [Smoke/CO Detectors] : smoke/CO detectors [Use assistive device to avoid falls] : use assistive device to avoid falls [Medical alert] : medical alert [Remove clutter and unsafe carpeting to avoid falls] : remove clutter and unsafe carpeting to avoid falls [Patient not on disease-modifying anti-rheumatic drug due to overall prognosis] : Patient not on disease-modifying anti-rheumatic drug due to overall prognosis [Minimize unnecessary interventions] : minimize unnecessary interventions [Improve pain control] : improve pain control [Advanced Directives discussed: ____] : Advanced directives discussed: [unfilled] [Discussed disease trajectory with patient/caregiver] : discussed disease trajectory with patient/caregiver [Full Code] : Code Status: Full Code [Annual Discussion and review of: ___] : Annual discussion and review of [unfilled] [FreeTextEntry1] : CHF management

## 2024-01-22 NOTE — HISTORY OF PRESENT ILLNESS
[[facility] Co-Management Patient] : [unfilled] is a [facility] co-management patient [In-Place] : has Home Health services in-place [A] : A [Patient is stable] : patient is stable [Education provided] : education provided [Patient] : patient [LastPVisitDate] : 12/23 [FreeTextEntry4] : Dr. Bo Perkins (Vascular Surgery), cardiology, orthopedic, cardiology for watchman device [FreeTextEntry2] : 01/22/2024 COVID SCREEN: Patient or caretaker denies fever, cough, trouble breathing, rash, vomiting. Patient has not been in close contact with anyone who is COVID-19 positive, or suspected of having COVID-19.  N95 mask, gloves, eye wear and gown (if indicated) used during visit: Yes.  Total face to face time with patient is 45 min.   ALYSE WILLSON is an 78 year with Hypertension, Hypertensive Heart Disease with CHF, CHF systolic dysfunction, Atrial Fibrillation, S/p watchman device in March 2023, CVA, Chronic Hepatitis C, Hyperlipidemia, Vasculitis seen today for follow-up home visit  Patient's last hospitalization was in 11/2022 for CVA at [facility].  Patient reports health is at baseline, no new complaints. Patient denies chest pain, palpitation, discomfort, distress, dizziness, headache and n/v.      Patient/ patient's caregiver reports no weight loss >10 lbs in the past 6 months. No changes in dentition or swallowing reported, No changes in hearing or vision reported. No changes in Cognition reported. Patient denies any symptoms of depression or anxiety. Patient is ADL independent/dependent and IADL independent/dependent. No changes in gait or falls reported.   Patient's home environment is safe.   Goals of care discussed 10min. Full Code, no limitations    [FreeTextEntry1] : Not homebound but use cane assisted walking in community

## 2024-01-22 NOTE — LETTER HEADER
[Care Plan reviewed every ___ weeks] : Care plan reviewed every [unfilled] weeks [Care Plan reviewed and provided to patient/caregiver] : Care plan reviewed and provided to patient/caregiver [Care Plan managed/Care coordinated by: ___] : Care plan managed/Care coordinated by: [unfilled] [Patient/Caregiver agrees to have other providers send summary of their care to this office] : Patient/caregiver agrees to have other providers send summary of their care to this office [Initiation or substantial revisions made to care plan involving mod/high medical decision making for complex CCM] : Initiation or substantial revisions made to care plan involving mod/high medical decision making for complex CCM

## 2024-01-24 LAB
ANION GAP SERPL CALC-SCNC: 12 MMOL/L
BUN SERPL-MCNC: 32 MG/DL
CALCIUM SERPL-MCNC: 10.4 MG/DL
CHLORIDE SERPL-SCNC: 104 MMOL/L
CO2 SERPL-SCNC: 24 MMOL/L
CREAT SERPL-MCNC: 1.28 MG/DL
CREAT SPEC-SCNC: 84 MG/DL
EGFR: 43 ML/MIN/1.73M2
GLUCOSE SERPL-MCNC: 83 MG/DL
HCT VFR BLD CALC: 40.6 %
HGB BLD-MCNC: 11.7 G/DL
MCHC RBC-ENTMCNC: 23.9 PG
MCHC RBC-ENTMCNC: 28.8 GM/DL
MCV RBC AUTO: 82.9 FL
MICROALBUMIN 24H UR DL<=1MG/L-MCNC: 1.9 MG/DL
MICROALBUMIN/CREAT 24H UR-RTO: 23 MG/G
PLATELET # BLD AUTO: 258 K/UL
POTASSIUM SERPL-SCNC: 4.4 MMOL/L
RBC # BLD: 4.9 M/UL
RBC # FLD: 18.5 %
SODIUM SERPL-SCNC: 140 MMOL/L
WBC # FLD AUTO: 5.62 K/UL

## 2024-03-18 ENCOUNTER — APPOINTMENT (OUTPATIENT)
Dept: HOME HEALTH SERVICES | Facility: HOME HEALTH | Age: 79
End: 2024-03-18

## 2024-04-11 ENCOUNTER — NON-APPOINTMENT (OUTPATIENT)
Age: 79
End: 2024-04-11

## 2024-04-13 ENCOUNTER — NON-APPOINTMENT (OUTPATIENT)
Age: 79
End: 2024-04-13

## 2024-04-13 ENCOUNTER — TRANSCRIPTION ENCOUNTER (OUTPATIENT)
Age: 79
End: 2024-04-13

## 2024-05-20 ENCOUNTER — APPOINTMENT (OUTPATIENT)
Dept: HOME HEALTH SERVICES | Facility: HOME HEALTH | Age: 79
End: 2024-05-20
Payer: MEDICARE

## 2024-05-20 VITALS
HEIGHT: 64 IN | OXYGEN SATURATION: 96 % | TEMPERATURE: 97.9 F | DIASTOLIC BLOOD PRESSURE: 78 MMHG | RESPIRATION RATE: 20 BRPM | SYSTOLIC BLOOD PRESSURE: 132 MMHG | HEART RATE: 82 BPM

## 2024-05-20 DIAGNOSIS — Z87.2 PERSONAL HISTORY OF DISEASES OF THE SKIN AND SUBCUTANEOUS TISSUE: ICD-10-CM

## 2024-05-20 DIAGNOSIS — R04.0 EPISTAXIS: ICD-10-CM

## 2024-05-20 DIAGNOSIS — S99.912A UNSPECIFIED INJURY OF LEFT ANKLE, INITIAL ENCOUNTER: ICD-10-CM

## 2024-05-20 DIAGNOSIS — J30.2 OTHER SEASONAL ALLERGIC RHINITIS: ICD-10-CM

## 2024-05-20 DIAGNOSIS — R21 RASH AND OTHER NONSPECIFIC SKIN ERUPTION: ICD-10-CM

## 2024-05-20 DIAGNOSIS — L02.91 PERSONAL HISTORY OF DISEASES OF THE SKIN AND SUBCUTANEOUS TISSUE: ICD-10-CM

## 2024-05-20 PROCEDURE — 99350 HOME/RES VST EST HIGH MDM 60: CPT

## 2024-05-20 RX ORDER — OXYCODONE AND ACETAMINOPHEN 5; 325 MG/1; MG/1
5-325 TABLET ORAL
Qty: 24 | Refills: 0 | Status: DISCONTINUED | COMMUNITY
Start: 2023-11-17 | End: 2024-05-20

## 2024-05-20 RX ORDER — AMOXICILLIN AND CLAVULANATE POTASSIUM 875; 125 MG/1; MG/1
875-125 TABLET, COATED ORAL
Qty: 14 | Refills: 0 | Status: DISCONTINUED | COMMUNITY
Start: 2024-04-13 | End: 2024-05-20

## 2024-05-20 RX ORDER — MELOXICAM 7.5 MG/1
7.5 TABLET ORAL
Qty: 30 | Refills: 0 | Status: ACTIVE | COMMUNITY
Start: 2024-05-07

## 2024-05-20 RX ORDER — INDOMETHACIN 50 MG/1
50 CAPSULE ORAL
Qty: 21 | Refills: 0 | Status: ACTIVE | COMMUNITY
Start: 2023-12-13

## 2024-05-20 RX ORDER — HYDROCORTISONE 25 MG/G
2.5 OINTMENT TOPICAL
Qty: 1 | Refills: 3 | Status: DISCONTINUED | COMMUNITY
Start: 2024-04-11 | End: 2024-05-20

## 2024-05-20 RX ORDER — LIDOCAINE 4% 4 G/100G
4 CREAM TOPICAL
Qty: 1 | Refills: 3 | Status: DISCONTINUED | COMMUNITY
Start: 2024-04-13 | End: 2024-05-20

## 2024-05-20 RX ORDER — BENZONATATE 100 MG/1
100 CAPSULE ORAL
Qty: 21 | Refills: 0 | Status: DISCONTINUED | COMMUNITY
Start: 2023-08-07 | End: 2024-05-20

## 2024-05-20 RX ORDER — CETIRIZINE HYDROCHLORIDE 10 MG/1
10 TABLET, FILM COATED ORAL
Qty: 1 | Refills: 0 | Status: DISCONTINUED | COMMUNITY
Start: 2024-04-11 | End: 2024-05-20

## 2024-05-20 RX ORDER — CIPROFLOXACIN HYDROCHLORIDE 500 MG/1
500 TABLET, FILM COATED ORAL
Qty: 28 | Refills: 0 | Status: DISCONTINUED | COMMUNITY
Start: 2023-11-22 | End: 2024-05-20

## 2024-05-20 RX ORDER — GUAIFENESIN 100 MG/5ML
100 LIQUID ORAL EVERY 4 HOURS
Qty: 300 | Refills: 0 | Status: DISCONTINUED | COMMUNITY
Start: 2023-08-07 | End: 2024-05-20

## 2024-05-20 NOTE — HEALTH RISK ASSESSMENT
[Independent] : managing medications [Some assistance needed] : managing finances [No falls in past year] : Patient reported no falls in the past year [No] : The patient does not have visual impairment

## 2024-05-20 NOTE — CHRONIC CARE ASSESSMENT
[3 or More Times Per Week] : exercises 3 or more times per week [General Adherence] : and is generally adherent [Walking] : walking [Low Fat Diet] : low fat [Low Carb Diet] : low carbohydrate [Low Salt Diet] : low salt

## 2024-05-20 NOTE — PHYSICAL EXAM
[No Respiratory Distress] : no respiratory distress [Cranial Nerves Intact] : cranial nerves 2-12 were intact [No Motor Deficits] : the motor exam was normal [Normal Reflexes] : deep tendon reflexes were 2+ and symmetric [Oriented x3] : oriented to person, place, and time

## 2024-05-20 NOTE — COUNSELING
[] : foot exam [Not Recommended] : Aspirin use not recommended due to overall prognosis [Full Code] : Code Status: Full Code [No Limitations] : Treatment Guidelines: No limitations [Long Term Intubation] : Intubation: Long term intubation

## 2024-05-20 NOTE — HISTORY OF PRESENT ILLNESS
[In-Place] : has Home Health services in-place [A] : A [[facility]s Co-Management Patient] : [unfilled] is a [facility] co-management patient [Patient is stable] : patient is stable [Education provided] : education provided [Patient] : patient [Family Member] : family member

## 2024-06-05 ENCOUNTER — NON-APPOINTMENT (OUTPATIENT)
Age: 79
End: 2024-06-05

## 2024-06-17 ENCOUNTER — NON-APPOINTMENT (OUTPATIENT)
Age: 79
End: 2024-06-17

## 2024-06-17 ENCOUNTER — TRANSCRIPTION ENCOUNTER (OUTPATIENT)
Age: 79
End: 2024-06-17

## 2024-06-17 RX ORDER — DICLOFENAC SODIUM 1% 10 MG/G
1 GEL TOPICAL
Qty: 1 | Refills: 3 | Status: ACTIVE | COMMUNITY
Start: 2024-06-17 | End: 1900-01-01

## 2024-06-20 ENCOUNTER — APPOINTMENT (OUTPATIENT)
Dept: HOME HEALTH SERVICES | Facility: HOME HEALTH | Age: 79
End: 2024-06-20
Payer: MEDICARE

## 2024-06-20 VITALS
OXYGEN SATURATION: 98 % | TEMPERATURE: 98.3 F | RESPIRATION RATE: 18 BRPM | HEART RATE: 76 BPM | HEIGHT: 64 IN | SYSTOLIC BLOOD PRESSURE: 128 MMHG | DIASTOLIC BLOOD PRESSURE: 66 MMHG

## 2024-06-20 DIAGNOSIS — I77.6 ARTERITIS, UNSPECIFIED: ICD-10-CM

## 2024-06-20 DIAGNOSIS — J44.89 OTHER SPECIFIED CHRONIC OBSTRUCTIVE PULMONARY DISEASE: ICD-10-CM

## 2024-06-20 DIAGNOSIS — I83.013 VARICOSE VEINS OF RIGHT LOWER EXTREMITY WITH ULCER OF ANKLE: ICD-10-CM

## 2024-06-20 DIAGNOSIS — N18.30 CHRONIC KIDNEY DISEASE, STAGE 3 UNSPECIFIED: ICD-10-CM

## 2024-06-20 DIAGNOSIS — S81.812A LACERATION W/OUT FOREIGN BODY, LEFT LOWER LEG, INITIAL ENCOUNTER: ICD-10-CM

## 2024-06-20 DIAGNOSIS — I48.20 CHRONIC ATRIAL FIBRILLATION, UNSP: ICD-10-CM

## 2024-06-20 DIAGNOSIS — S31.109A UNSPECIFIED OPEN WOUND OF ABDOMINAL WALL, UNSPECIFIED QUADRANT W/OUT PENETRATION INTO PERITONEAL CAVITY, INITIAL ENCOUNTER: ICD-10-CM

## 2024-06-20 DIAGNOSIS — I50.22 CHRONIC SYSTOLIC (CONGESTIVE) HEART FAILURE: ICD-10-CM

## 2024-06-20 DIAGNOSIS — B18.2 CHRONIC VIRAL HEPATITIS C: ICD-10-CM

## 2024-06-20 DIAGNOSIS — L97.319 VARICOSE VEINS OF RIGHT LOWER EXTREMITY WITH ULCER OF ANKLE: ICD-10-CM

## 2024-06-20 DIAGNOSIS — M94.0 CHONDROCOSTAL JUNCTION SYNDROME [TIETZE]: ICD-10-CM

## 2024-06-20 DIAGNOSIS — I63.49 CEREBRAL INFARCTION DUE TO EMBOLISM OF OTHER CEREBRAL ARTERY: ICD-10-CM

## 2024-06-20 PROCEDURE — 99349 HOME/RES VST EST MOD MDM 40: CPT

## 2024-06-20 RX ORDER — SILVER/FOAM BANDAGE 4"X4 3/4"
7.5X7.5CM BANDAGE TOPICAL DAILY
Qty: 90 | Refills: 1 | Status: DISCONTINUED | COMMUNITY
Start: 2024-01-22 | End: 2024-06-20

## 2024-06-20 RX ORDER — NEOMYCIN-BACITRACN ZN-POLYMYX 3.5 MG-400 UNIT-5,000 UNIT/GRAM TOP OINT
OINTMENT TOPICAL 3 TIMES DAILY
Qty: 1 | Refills: 0 | Status: DISCONTINUED | COMMUNITY
Start: 2024-05-20 | End: 2024-06-20

## 2024-06-20 RX ORDER — MAG HYDROX/ALUMINUM HYD/SIMETH 400-400-40
0.9 SUSPENSION, ORAL (FINAL DOSE FORM) ORAL
Qty: 3 | Refills: 3 | Status: DISCONTINUED | COMMUNITY
Start: 2024-05-20 | End: 2024-06-20

## 2024-06-20 RX ORDER — ARM BRACE
EACH MISCELLANEOUS
Qty: 1 | Refills: 0 | Status: DISCONTINUED | COMMUNITY
Start: 2023-11-09 | End: 2024-06-20

## 2024-06-20 RX ORDER — CEPHALEXIN 500 MG/1
500 TABLET ORAL
Qty: 20 | Refills: 0 | Status: DISCONTINUED | COMMUNITY
Start: 2024-06-06 | End: 2024-06-20

## 2024-06-20 RX ORDER — GAUZE BANDAGE 4" X 4"
4"X4" BANDAGE TOPICAL
Qty: 4 | Refills: 3 | Status: DISCONTINUED | COMMUNITY
Start: 2024-05-20 | End: 2024-06-20

## 2024-06-20 RX ORDER — CEPHALEXIN 500 MG/1
500 TABLET ORAL
Qty: 14 | Refills: 0 | Status: DISCONTINUED | COMMUNITY
Start: 2024-05-20 | End: 2024-06-20

## 2024-06-20 RX ORDER — TRIAMCINOLONE ACETONIDE 1 MG/G
0.1 CREAM TOPICAL 3 TIMES DAILY
Qty: 1 | Refills: 3 | Status: DISCONTINUED | COMMUNITY
Start: 2024-05-16 | End: 2024-06-20

## 2024-06-20 NOTE — REVIEW OF SYSTEMS
[Chest Pain] : no chest pain [Palpitations] : no palpitations [Leg Claudication] : no leg claudication [Lower Ext Edema] : lower extremity edema [Orthopnea] : no orthopnea [Paroxysmal Nocturnal Dyspnea] : no paroxysmal nocturnal dyspnea [Itching] : no itching [Mole Changes] : no mole changes [Nail Changes] : no nail changes [Hair Changes] : no hair changes [Skin Rash] : skin rash [Headache] : no headache [Dizziness] : no dizziness [Fainting] : no fainting [Confusion] : no confusion [Memory Loss] : no memory loss [Unsteady Walking] : ataxia [Negative] : Heme/Lymph [FreeTextEntry5] : left ankle edema +1 due to history of vasculitis  [de-identified] : vascular ulcer to the left lower healed, newly open wound to LLQ abdomen [de-identified] : use cane outside due to unsteady walking

## 2024-06-20 NOTE — PHYSICAL EXAM
[No Motor Deficits] : the motor exam was normal [Normal Reflexes] : deep tendon reflexes were 2+ and symmetric [No Acute Distress] : no acute distress [Normal Voice/Communication] : normal voice communication [Normal Sclera/Conjunctiva] : normal sclera/conjunctiva [EOMI] : extra ocular movement intact [Normal Outer Ear/Nose] : the ears and nose were normal in appearance [Normal Nasal Mucosa] : the nasal mucosa was normal [No JVD] : no jugular venous distention [No LAD] : no lymphadenopathy [No Respiratory Distress] : no respiratory distress [Clear to Auscultation] : lungs were clear to auscultation bilaterally [No Accessory Muscle Use] : no accessory muscle use [Normal Rate] : heart rate was normal  [Normal S1, S2] : normal S1 and S2 [No Murmurs] : no murmurs heard [Breast Exam Declined] : patient declined to have breast exam done [Normal Bowel Sounds] : normal bowel sounds [Non Tender] : non-tender [Soft] : abdomen soft [Not Distended] : not distended [Normal Post Cervical Nodes] : no posterior cervical lymphadenopathy [Normal Anterior Cervical Nodes] : no anterior cervical lymphadenopathy [No CVA Tenderness] : no ~M costovertebral angle tenderness [No Spinal Tenderness] : no spinal tenderness [Scoliosis] : scoliosis not present [No Joint Swelling] : no joint swelling seen [Normal Strength/Tone] : muscle strength and tone were normal [No Skin Lesions] : no skin lesions [Cranial Nerves Intact] : cranial nerves 2-12 were intact [Oriented x3] : oriented to person, place, and time [Over the Past 2 Weeks, Have You Felt Down, Depressed, or Hopeless?] : 1.) Over the past 2 weeks, have you felt down, depressed, or hopeless? No [Over the Past 2 Weeks, Have You Felt Little Interest or Pleasure Doing Things?] : 2.) Over the past 2 weeks, have you felt little interest or pleasure doing things? No [Foot Ulcers] : no foot ulcers [de-identified] : b/l ankle  edema +1 [de-identified] : vascular ulcer to left lower extremity healed with dry skin

## 2024-06-20 NOTE — HEALTH RISK ASSESSMENT
[Independent] : managing medications [Some assistance needed] : managing finances [No falls in past year] : Patient reported no falls in the past year [No] : The patient does not have visual impairment [HRA Reviewed] : Health risk assessment reviewed [TimeGetUpGo] : 5 [de-identified] : walks independently at home and use cane in community

## 2024-06-20 NOTE — COUNSELING
[] : foot exam [Not Recommended] : Aspirin use not recommended due to overall prognosis [Full Code] : Code Status: Full Code [No Limitations] : Treatment Guidelines: No limitations [Long Term Intubation] : Intubation: Long term intubation [Overweight - ( BMI 25.1 - 29.9 )] : overweight -  ( BMI 25.1 - 29.9 ) [Sodium restriction 2gm recommended] : sodium restriction 2 gm recommended [Non - Smoker] : non-smoker [Smoke/CO Detectors] : smoke/CO detectors [Use grab bars] : use grab bars [Medical alert] : medical alert [Use assistive device to avoid falls] : use assistive device to avoid falls [Remove clutter and unsafe carpeting to avoid falls] : remove clutter and unsafe carpeting to avoid falls [Patient not on disease-modifying anti-rheumatic drug due to overall prognosis] : Patient not on disease-modifying anti-rheumatic drug due to overall prognosis [FreeTextEntry1] : CHF management  [Improve pain control] : improve pain control [FreeTextEntry3] : CHF management diet [Minimize unnecessary interventions] : minimize unnecessary interventions [Discussed disease trajectory with patient/caregiver] : discussed disease trajectory with patient/caregiver

## 2024-06-20 NOTE — CHRONIC CARE ASSESSMENT
[3 or More Times Per Week] : exercises 3 or more times per week [General Adherence] : and is generally adherent [Walking] : walking [Low Fat Diet] : low fat [Low Carb Diet] : low carbohydrate [Low Salt Diet] : low salt [de-identified] : intensive cardiac history, vasculitis following with vascular surgeon, A-fib, s/p watchman device, CHF [de-identified] : walks regularly [de-identified] : Low salt, low fat, low carb [FreeTextEntry1] : CHF diet management [PPS Score: ____] : Palliative Performance Scale (PPS) Score: [unfilled]

## 2024-06-20 NOTE — HISTORY OF PRESENT ILLNESS
[In-Place] : has Home Health services in-place [A] : A [LastPCPVisitDate] : 03/24 [FreeTextEntry4] : Dr. Bo Perkins (Vascular Surgery), cardiology, orthopedic, cardiology for watchman device [FreeTextEntry1] : Not homebound  [FreeTextEntry2] : 06/20/2024 COVID SCREEN: Patient or caretaker denies fever, cough, trouble breathing, rash, vomiting. Patient has not been in close contact with anyone who is COVID-19 positive, or suspected of having COVID-19.  N95 mask, gloves, eye wear and gown (if indicated) used during visit: Yes.  Total face to face time with patient is 45 min.  ALYSE WILLSON is an 78 year with PMHx Hypertension, Hypertensive Heart Disease with CHF, CHF systolic dysfunction, Atrial Fibrillation, S/p watchman device in March 2023, CVA, Chronic Hepatitis C, Hyperlipidemia, Vasculitis seen today home visit for acute condition.   Patient was with neighbors at the [facility] during the visit.    Patient's last hospitalization was in 11/2022 for CVA at [facility].  Interim: On June 17th, 2024 ANAMIKA CULLEN  78-year-female called [facility] complaints of labored breathing. Patient reports when she takes a deep breath she feels pain in her chest. Patient reports spasm of right upper side 10/10, comes in waves, no pain in arm neck or jaw. Pain was reproducible    Findings by [facility]amedics: Vital Signs: 110/69mmH 97% afebrile, 109 EKG inversions. CP administered 4mg x1 IM diclofenac. Patient remained home Diagnoses Costochondritis (733.6) (M94.0) -improved with intervention -start topical NSAID cream -discussed stretching   Orders Start: Diclofenac Sodium 1 % External Gel; APPLY SPARINGLY EXTERNALLY TO THE AFFECTED AREA THREE TIMES DAILY      Today, patient reports health is at baseline. Patient denies chest pain, palpitation, discomfort, distress, dizziness, headache and n/v.   Patient/ patient's caregiver reports no weight loss >10 lbs in the past 6 months. No changes in dentition or swallowing reported, No changes in hearing or vision reported. No changes in Cognition reported. Patient denies any symptoms of depression or anxiety. Patient is ADL independent/dependent and IADL independent/dependent. No changes in gait or falls reported.   Patient's home environment is safe.   Goals of care discussed 10min. Full Code, no limitations

## 2024-06-20 NOTE — REASON FOR VISIT
[Acute] : an acute visit [FreeTextEntry1] : Costochondritis [FreeTextEntry2] : chart review [FreeTextEntry3] : Cardiology, Vascular surgeon, cardiology for watchman device, orthopedic

## 2024-07-01 ENCOUNTER — APPOINTMENT (OUTPATIENT)
Dept: DERMATOLOGY | Facility: CLINIC | Age: 79
End: 2024-07-01

## 2024-08-05 ENCOUNTER — APPOINTMENT (OUTPATIENT)
Dept: DERMATOLOGY | Facility: CLINIC | Age: 79
End: 2024-08-05

## 2024-11-01 ENCOUNTER — APPOINTMENT (OUTPATIENT)
Dept: HOME HEALTH SERVICES | Facility: HOME HEALTH | Age: 79
End: 2024-11-01
Payer: MEDICARE

## 2024-11-01 VITALS — DIASTOLIC BLOOD PRESSURE: 72 MMHG | SYSTOLIC BLOOD PRESSURE: 128 MMHG | HEART RATE: 75 BPM | TEMPERATURE: 97.1 F

## 2024-11-01 DIAGNOSIS — N18.30 CHRONIC KIDNEY DISEASE, STAGE 3 UNSPECIFIED: ICD-10-CM

## 2024-11-01 DIAGNOSIS — I48.20 CHRONIC ATRIAL FIBRILLATION, UNSP: ICD-10-CM

## 2024-11-01 DIAGNOSIS — Z71.89 OTHER SPECIFIED COUNSELING: ICD-10-CM

## 2024-11-01 DIAGNOSIS — J44.89 OTHER SPECIFIED CHRONIC OBSTRUCTIVE PULMONARY DISEASE: ICD-10-CM

## 2024-11-01 DIAGNOSIS — Z23 ENCOUNTER FOR IMMUNIZATION: ICD-10-CM

## 2024-11-01 DIAGNOSIS — J01.90 ACUTE SINUSITIS, UNSPECIFIED: ICD-10-CM

## 2024-11-01 DIAGNOSIS — M41.9 SCOLIOSIS, UNSPECIFIED: ICD-10-CM

## 2024-11-01 DIAGNOSIS — M94.0 CHONDROCOSTAL JUNCTION SYNDROME [TIETZE]: ICD-10-CM

## 2024-11-01 DIAGNOSIS — I83.013 VARICOSE VEINS OF RIGHT LOWER EXTREMITY WITH ULCER OF ANKLE: ICD-10-CM

## 2024-11-01 DIAGNOSIS — I77.6 ARTERITIS, UNSPECIFIED: ICD-10-CM

## 2024-11-01 DIAGNOSIS — L97.319 VARICOSE VEINS OF RIGHT LOWER EXTREMITY WITH ULCER OF ANKLE: ICD-10-CM

## 2024-11-01 DIAGNOSIS — B18.2 CHRONIC VIRAL HEPATITIS C: ICD-10-CM

## 2024-11-01 DIAGNOSIS — I63.49 CEREBRAL INFARCTION DUE TO EMBOLISM OF OTHER CEREBRAL ARTERY: ICD-10-CM

## 2024-11-01 DIAGNOSIS — I50.22 CHRONIC SYSTOLIC (CONGESTIVE) HEART FAILURE: ICD-10-CM

## 2024-11-01 DIAGNOSIS — E66.01 MORBID (SEVERE) OBESITY DUE TO EXCESS CALORIES: ICD-10-CM

## 2024-11-01 PROCEDURE — 99349 HOME/RES VST EST MOD MDM 40: CPT

## 2024-11-01 RX ORDER — DIGOXIN 125 UG/1
125 TABLET ORAL DAILY
Qty: 30 | Refills: 0 | Status: ACTIVE | COMMUNITY
Start: 2024-11-01

## 2024-11-01 RX ORDER — AMOXICILLIN AND CLAVULANATE POTASSIUM 875; 125 MG/1; MG/1
875-125 TABLET, COATED ORAL
Qty: 14 | Refills: 0 | Status: ACTIVE | COMMUNITY
Start: 2024-11-01 | End: 1900-01-01

## 2024-11-01 RX ORDER — ATORVASTATIN CALCIUM 40 MG/1
40 TABLET, FILM COATED ORAL
Qty: 1 | Refills: 3 | Status: ACTIVE | COMMUNITY
Start: 2024-11-01

## 2025-02-19 ENCOUNTER — APPOINTMENT (OUTPATIENT)
Dept: HOME HEALTH SERVICES | Facility: HOME HEALTH | Age: 80
End: 2025-02-19
Payer: MEDICARE

## 2025-02-19 VITALS
HEART RATE: 104 BPM | TEMPERATURE: 97 F | SYSTOLIC BLOOD PRESSURE: 128 MMHG | OXYGEN SATURATION: 98 % | DIASTOLIC BLOOD PRESSURE: 74 MMHG

## 2025-02-19 DIAGNOSIS — R00.0 TACHYCARDIA, UNSPECIFIED: ICD-10-CM

## 2025-02-19 DIAGNOSIS — I77.6 ARTERITIS, UNSPECIFIED: ICD-10-CM

## 2025-02-19 DIAGNOSIS — N18.30 CHRONIC KIDNEY DISEASE, STAGE 3 UNSPECIFIED: ICD-10-CM

## 2025-02-19 DIAGNOSIS — I63.49 CEREBRAL INFARCTION DUE TO EMBOLISM OF OTHER CEREBRAL ARTERY: ICD-10-CM

## 2025-02-19 DIAGNOSIS — J44.89 OTHER SPECIFIED CHRONIC OBSTRUCTIVE PULMONARY DISEASE: ICD-10-CM

## 2025-02-19 DIAGNOSIS — L97.319 VARICOSE VEINS OF RIGHT LOWER EXTREMITY WITH ULCER OF ANKLE: ICD-10-CM

## 2025-02-19 DIAGNOSIS — B18.2 CHRONIC VIRAL HEPATITIS C: ICD-10-CM

## 2025-02-19 DIAGNOSIS — E66.01 MORBID (SEVERE) OBESITY DUE TO EXCESS CALORIES: ICD-10-CM

## 2025-02-19 DIAGNOSIS — I48.20 CHRONIC ATRIAL FIBRILLATION, UNSP: ICD-10-CM

## 2025-02-19 DIAGNOSIS — I83.013 VARICOSE VEINS OF RIGHT LOWER EXTREMITY WITH ULCER OF ANKLE: ICD-10-CM

## 2025-02-19 DIAGNOSIS — I50.22 CHRONIC SYSTOLIC (CONGESTIVE) HEART FAILURE: ICD-10-CM

## 2025-02-19 PROCEDURE — 99349 HOME/RES VST EST MOD MDM 40: CPT

## 2025-04-30 ENCOUNTER — APPOINTMENT (OUTPATIENT)
Dept: HOME HEALTH SERVICES | Facility: HOME HEALTH | Age: 80
End: 2025-04-30
Payer: MEDICARE

## 2025-04-30 VITALS
DIASTOLIC BLOOD PRESSURE: 85 MMHG | SYSTOLIC BLOOD PRESSURE: 142 MMHG | OXYGEN SATURATION: 98 % | HEART RATE: 82 BPM | TEMPERATURE: 97 F

## 2025-04-30 DIAGNOSIS — B18.2 CHRONIC VIRAL HEPATITIS C: ICD-10-CM

## 2025-04-30 DIAGNOSIS — J44.89 OTHER SPECIFIED CHRONIC OBSTRUCTIVE PULMONARY DISEASE: ICD-10-CM

## 2025-04-30 DIAGNOSIS — I50.22 CHRONIC SYSTOLIC (CONGESTIVE) HEART FAILURE: ICD-10-CM

## 2025-04-30 DIAGNOSIS — I10 ESSENTIAL (PRIMARY) HYPERTENSION: ICD-10-CM

## 2025-04-30 DIAGNOSIS — I77.6 ARTERITIS, UNSPECIFIED: ICD-10-CM

## 2025-04-30 DIAGNOSIS — N18.30 CHRONIC KIDNEY DISEASE, STAGE 3 UNSPECIFIED: ICD-10-CM

## 2025-04-30 DIAGNOSIS — E66.01 MORBID (SEVERE) OBESITY DUE TO EXCESS CALORIES: ICD-10-CM

## 2025-04-30 DIAGNOSIS — I48.20 CHRONIC ATRIAL FIBRILLATION, UNSP: ICD-10-CM

## 2025-04-30 DIAGNOSIS — Z71.89 OTHER SPECIFIED COUNSELING: ICD-10-CM

## 2025-04-30 PROCEDURE — 99349 HOME/RES VST EST MOD MDM 40: CPT | Mod: 25

## 2025-04-30 PROCEDURE — 99497 ADVNCD CARE PLAN 30 MIN: CPT

## 2025-06-11 ENCOUNTER — APPOINTMENT (OUTPATIENT)
Dept: HOME HEALTH SERVICES | Facility: HOME HEALTH | Age: 80
End: 2025-06-11
Payer: MEDICARE

## 2025-06-11 PROCEDURE — 99349 HOME/RES VST EST MOD MDM 40: CPT

## 2025-06-12 VITALS
OXYGEN SATURATION: 100 % | HEART RATE: 72 BPM | TEMPERATURE: 97 F | SYSTOLIC BLOOD PRESSURE: 126 MMHG | DIASTOLIC BLOOD PRESSURE: 74 MMHG

## 2025-06-12 RX ORDER — DIGOXIN 250 UG/1
250 TABLET ORAL DAILY
Qty: 30 | Refills: 0 | Status: ACTIVE | COMMUNITY
Start: 2025-06-12

## 2025-07-15 ENCOUNTER — APPOINTMENT (OUTPATIENT)
Dept: HOME HEALTH SERVICES | Facility: HOME HEALTH | Age: 80
End: 2025-07-15
Payer: MEDICARE

## 2025-07-15 PROCEDURE — 99349 HOME/RES VST EST MOD MDM 40: CPT | Mod: 93

## 2025-07-15 RX ORDER — CEPHALEXIN 500 MG/1
500 CAPSULE ORAL
Qty: 10 | Refills: 0 | Status: ACTIVE | COMMUNITY
Start: 2025-07-15 | End: 1900-01-01

## 2025-07-17 PROBLEM — R07.9 CHEST PAIN: Status: ACTIVE | Noted: 2025-07-17

## 2025-07-17 PROBLEM — R06.02 SOB (SHORTNESS OF BREATH) ON EXERTION: Status: ACTIVE | Noted: 2025-07-17

## 2025-07-17 RX ORDER — MAGNESIUM OXIDE 241.3 MG/1000MG
400 TABLET ORAL
Qty: 60 | Refills: 0 | Status: ACTIVE | COMMUNITY
Start: 2025-06-30

## 2025-07-17 RX ORDER — LEVOFLOXACIN 750 MG/1
750 TABLET, FILM COATED ORAL
Qty: 3 | Refills: 0 | Status: DISCONTINUED | COMMUNITY
Start: 2025-06-30 | End: 2025-07-17

## 2025-07-17 RX ORDER — CLOBETASOL PROPIONATE CREAM USP, 0.05% 0.5 MG/G
0.05 CREAM TOPICAL
Qty: 60 | Refills: 0 | Status: ACTIVE | COMMUNITY
Start: 2025-07-10

## 2025-07-17 RX ORDER — AMMONIUM LACTATE 12 %
12 CREAM (GRAM) TOPICAL
Qty: 385 | Refills: 0 | Status: ACTIVE | COMMUNITY
Start: 2025-07-10

## 2025-07-17 RX ORDER — FUROSEMIDE 20 MG/1
20 TABLET ORAL
Qty: 90 | Refills: 0 | Status: ACTIVE | COMMUNITY
Start: 2025-06-30